# Patient Record
Sex: FEMALE | Race: BLACK OR AFRICAN AMERICAN
[De-identification: names, ages, dates, MRNs, and addresses within clinical notes are randomized per-mention and may not be internally consistent; named-entity substitution may affect disease eponyms.]

---

## 2019-06-10 ENCOUNTER — HOSPITAL ENCOUNTER (INPATIENT)
Dept: HOSPITAL 74 - YASAS | Age: 49
LOS: 7 days | Discharge: HOME | DRG: 772 | End: 2019-06-17
Attending: NEUROMUSCULOSKELETAL MEDICINE & OMM | Admitting: NEUROMUSCULOSKELETAL MEDICINE & OMM
Payer: COMMERCIAL

## 2019-06-10 VITALS — BODY MASS INDEX: 22.1 KG/M2

## 2019-06-10 DIAGNOSIS — Z87.09: ICD-10-CM

## 2019-06-10 DIAGNOSIS — K04.7: ICD-10-CM

## 2019-06-10 DIAGNOSIS — Z59.0: ICD-10-CM

## 2019-06-10 DIAGNOSIS — L98.491: ICD-10-CM

## 2019-06-10 DIAGNOSIS — F14.20: Primary | ICD-10-CM

## 2019-06-10 DIAGNOSIS — F10.10: ICD-10-CM

## 2019-06-10 DIAGNOSIS — Z86.2: ICD-10-CM

## 2019-06-10 PROCEDURE — HZ42ZZZ GROUP COUNSELING FOR SUBSTANCE ABUSE TREATMENT, COGNITIVE-BEHAVIORAL: ICD-10-PCS | Performed by: NEUROMUSCULOSKELETAL MEDICINE & OMM

## 2019-06-10 RX ADMIN — Medication SCH: at 23:44

## 2019-06-10 SDOH — ECONOMIC STABILITY - HOUSING INSECURITY: HOMELESSNESS: Z59.0

## 2019-06-10 NOTE — HP
CIWA Score


Nausea/Vomitin-No Nausea/No Vomiting


Muscle Tremors: None


Anxiety: 0-No Anxiety, at Ease


Agitation: 1-Slight > Activity


Paroxysmal Sweats: No Perspiration


Orientation: 0-Oriented


Tacttile Disturbances: 0-None


Auditory Disturbances: 0-None


Visual Disturbances: 0-None


Headache: 3-Moderate


CIWA-Ar Total Score: 4





- Admission Criteria


[facility] Guidelines: Admission for Medically Managed Detox: 


Requires at least one of the followin. CIWA greater than 12


2. Seizures within the past 24 hours


3. Delirium tremens within the past 24 hours


4. Hallucinations within the past 24 hours


5. Acute intervention needed for co  occurring medical disorder


6. Acute intervention needed for co  occurring psychiatric disorder


7. Severe withdrawal that cannot be handled at a lower level of care (continued


    vomiting, continued diarrhea, abnormal vital signs) requiring intravenous


    medication and/or fluids


8. Pregnancy





Patient presents the following: CIWA greater than 12


Admission Criteria Met: Admission criteria met





Admission ROS S





- HPI


Chief Complaint: 





here for rehab for cocaine/crack





47 yo with h/o asthma, anemia, homeless stays in different places in the [place]. 

Does not work. Says looking for [place] [place] housing. does not have a PCP. CIWA 

score- 6





crack/cocaine- $/day


heroin- no


alcohol- drinks every 2 days, never had any withdrawal Sx





DUR- no controlled substances


Allergies/Adverse Reactions: 


 Allergies











Allergy/AdvReac Type Severity Reaction Status Date / Time


 


No Known Allergies Allergy   Verified 06/10/19 18:22














- Ebola screening


Have you traveled outside of the country in the last 21 days: No (N)


Have you had contact with anyone from an Ebola affected area: No


Do you have a fever: No





Patient History





- Smoking Cessation


Smoking history: Unknown if ever smoked





- Substances abused


  ** Alcohol


Substance route: Skin popping


Frequency: Daily


Amount used: 2 -3(40 ox [facility] [place]), 1/2 -1 pint


Age of first use: 24


Date of last use: 19





  ** Crack


Substance route: Smoking


Frequency: Daily


Amount used: $100


Age of first use: 45


Date of last use: 19





Admission Physical Exam [facility]





- Vital Signs


Vital Signs: 


 Vital Signs - 24 hr











  06/10/19





  18:26


 


Temperature 99.3 F


 


Pulse Rate 74


 


Respiratory 18





Rate 


 


Blood Pressure 108/47 L














Breathalyzer





- Breathalyzer


Breathalyzer: 0





Urine Drug Screen





- Test Device


Lot number: [id number]


Expiration date: 21





- Control


Is test valid?: Yes





- Results


Drug screen NEGATIVE: No


Urine drug screen results: SETH-Cocaine





Inpatient Rehab Admission





- Rehab Decision to Admit


Inpatient rehab admission?: Yes





- Initial Determination


Are CD services needed?: Yes


Free of communicable disease: Yes


Not in need of hospitalization: Yes





- Rehab Admission Criteria


Previous failed treatment: Yes


Poor recovery environment: Yes


Comorbidities: No


Lacks judgement: Yes


Patient is meeting Inpatient Rehab admission criteria:: Yes (rehab from cocaine 

use)

## 2019-06-11 LAB
ALBUMIN SERPL-MCNC: 3.3 G/DL (ref 3.4–5)
ALP SERPL-CCNC: 77 U/L (ref 45–117)
ALT SERPL-CCNC: 33 U/L (ref 13–61)
ANION GAP SERPL CALC-SCNC: 8 MMOL/L (ref 8–16)
AST SERPL-CCNC: 32 U/L (ref 15–37)
BILIRUB SERPL-MCNC: 0.4 MG/DL (ref 0.2–1)
BUN SERPL-MCNC: 13.7 MG/DL (ref 7–18)
CALCIUM SERPL-MCNC: 8.6 MG/DL (ref 8.5–10.1)
CHLORIDE SERPL-SCNC: 109 MMOL/L (ref 98–107)
CO2 SERPL-SCNC: 24 MMOL/L (ref 21–32)
CREAT SERPL-MCNC: 0.8 MG/DL (ref 0.55–1.3)
DEPRECATED RDW RBC AUTO: 18.9 % (ref 11.6–15.6)
GLUCOSE SERPL-MCNC: 94 MG/DL (ref 74–106)
HCT VFR BLD CALC: 27.8 % (ref 32.4–45.2)
HGB BLD-MCNC: 8.9 GM/DL (ref 10.7–15.3)
MCH RBC QN AUTO: 25.8 PG (ref 25.7–33.7)
MCHC RBC AUTO-ENTMCNC: 32 G/DL (ref 32–36)
MCV RBC: 80.7 FL (ref 80–96)
PLATELET # BLD AUTO: 291 K/MM3 (ref 134–434)
PMV BLD: 7.8 FL (ref 7.5–11.1)
POTASSIUM SERPLBLD-SCNC: 4 MMOL/L (ref 3.5–5.1)
PROT SERPL-MCNC: 6.9 G/DL (ref 6.4–8.2)
RBC # BLD AUTO: 3.44 M/MM3 (ref 3.6–5.2)
SODIUM SERPL-SCNC: 141 MMOL/L (ref 136–145)
WBC # BLD AUTO: 6.4 K/MM3 (ref 4–10)

## 2019-06-11 RX ADMIN — Medication SCH TAB: at 09:14

## 2019-06-11 RX ADMIN — IBUPROFEN PRN MG: 400 TABLET, FILM COATED ORAL at 14:07

## 2019-06-11 RX ADMIN — AMOXICILLIN AND CLAVULANATE POTASSIUM SCH TAB: 875; 125 TABLET, FILM COATED ORAL at 17:55

## 2019-06-11 RX ADMIN — Medication SCH MG: at 21:17

## 2019-06-11 NOTE — PN
[facility] Progress Note


Note: 


PT IS A 47 Y/O FEMALE ADMITTED YESTERDAY FROM [facility] NOW  C/O LOWER JAW/CHIN AND 

TOOTHACHE. REPORTS A TYPE OF ULCER ON CHIN AREA STATING IT'S AN ABSCESS FROM 

TOOTH INFECTION. DENIES RECENT VISIT TO DENTIST. REQUESTING TO GO NOW TO SEE A 

DENTIST.  AS PER H/P, PT IS HOMELESS AND HAS NO CURRENT PCP FOR MEDICAL 

MANAGEMENT.


 


 


 Vital Signs - 24 hr











  06/10/19 06/11/19 06/11/19





  18:26 03:30 07:04


 


Temperature 99.3 F  97.9 F


 


Pulse Rate 74  62


 


Respiratory 18 16 16





Rate   


 


Blood Pressure 108/47 L  99/61








 Laboratory Tests











  06/11/19 06/11/19





  09:00 09:00


 


WBC  6.4 


 


RBC  3.44 L 


 


Hgb  8.9 L 


 


Hct  27.8 L 


 


MCV  80.7 


 


MCH  25.8 


 


MCHC  32.0 


 


RDW  18.9 H 


 


Plt Count  291 


 


MPV  7.8 


 


Sodium   141


 


Potassium   4.0


 


Chloride   109 H


 


Carbon Dioxide   24


 


Anion Gap   8


 


BUN   13.7


 


Creatinine   0.8


 


Est GFR (CKD-EPI)AfAm   101.75


 


Est GFR (CKD-EPI)NonAf   87.79


 


Random Glucose   94


 


Calcium   8.6


 


Total Bilirubin   0.4


 


AST   32


 


ALT   33


 


Alkaline Phosphatase   77


 


Total Protein   6.9


 


Albumin   3.3 L








ORAL EXAM:LOWER JAW TOOTH DECAY /GUM SWELLING ALONG LOWER INCISOR.


DARK COLORED OPEN SORE TO CHIN 





A:TOOTH DECAY


TOOTH ABSCESS


CHIN ULCER





PLAN:BACITRACIN OINTMENT BID TO ULCER ON CHIN.


AUGMENTIN 875 MG PO BID 7 DAYS

## 2019-06-12 RX ADMIN — AMOXICILLIN AND CLAVULANATE POTASSIUM SCH TAB: 875; 125 TABLET, FILM COATED ORAL at 07:36

## 2019-06-12 RX ADMIN — Medication SCH MG: at 21:30

## 2019-06-12 RX ADMIN — Medication SCH TAB: at 10:11

## 2019-06-12 RX ADMIN — AMOXICILLIN AND CLAVULANATE POTASSIUM SCH TAB: 875; 125 TABLET, FILM COATED ORAL at 18:14

## 2019-06-13 RX ADMIN — IBUPROFEN PRN MG: 400 TABLET, FILM COATED ORAL at 11:14

## 2019-06-13 RX ADMIN — AMOXICILLIN AND CLAVULANATE POTASSIUM SCH TAB: 875; 125 TABLET, FILM COATED ORAL at 07:41

## 2019-06-13 RX ADMIN — Medication SCH: at 11:02

## 2019-06-13 RX ADMIN — Medication SCH MG: at 21:46

## 2019-06-13 RX ADMIN — AMOXICILLIN AND CLAVULANATE POTASSIUM SCH TAB: 875; 125 TABLET, FILM COATED ORAL at 16:53

## 2019-06-14 RX ADMIN — Medication SCH TAB: at 10:58

## 2019-06-14 RX ADMIN — Medication SCH MG: at 21:13

## 2019-06-14 RX ADMIN — AMOXICILLIN AND CLAVULANATE POTASSIUM SCH TAB: 875; 125 TABLET, FILM COATED ORAL at 17:30

## 2019-06-14 RX ADMIN — AMOXICILLIN AND CLAVULANATE POTASSIUM SCH TAB: 875; 125 TABLET, FILM COATED ORAL at 09:00

## 2019-06-15 RX ADMIN — Medication SCH MG: at 21:11

## 2019-06-15 RX ADMIN — Medication SCH TAB: at 10:02

## 2019-06-15 RX ADMIN — AMOXICILLIN AND CLAVULANATE POTASSIUM SCH TAB: 875; 125 TABLET, FILM COATED ORAL at 07:19

## 2019-06-15 RX ADMIN — AMOXICILLIN AND CLAVULANATE POTASSIUM SCH TAB: 875; 125 TABLET, FILM COATED ORAL at 16:41

## 2019-06-16 RX ADMIN — Medication SCH TAB: at 09:10

## 2019-06-16 RX ADMIN — AMOXICILLIN AND CLAVULANATE POTASSIUM SCH TAB: 875; 125 TABLET, FILM COATED ORAL at 07:41

## 2019-06-16 RX ADMIN — Medication SCH: at 21:43

## 2019-06-16 RX ADMIN — AMOXICILLIN AND CLAVULANATE POTASSIUM SCH: 875; 125 TABLET, FILM COATED ORAL at 18:45

## 2019-06-17 ENCOUNTER — HOSPITAL ENCOUNTER (EMERGENCY)
Dept: HOSPITAL 74 - JER | Age: 49
Discharge: HOME | End: 2019-06-17
Payer: COMMERCIAL

## 2019-06-17 VITALS — TEMPERATURE: 98.7 F | DIASTOLIC BLOOD PRESSURE: 62 MMHG | HEART RATE: 72 BPM | SYSTOLIC BLOOD PRESSURE: 98 MMHG

## 2019-06-17 VITALS — SYSTOLIC BLOOD PRESSURE: 108 MMHG | HEART RATE: 76 BPM | DIASTOLIC BLOOD PRESSURE: 63 MMHG | TEMPERATURE: 98 F

## 2019-06-17 VITALS — BODY MASS INDEX: 19.3 KG/M2

## 2019-06-17 DIAGNOSIS — F14.10: ICD-10-CM

## 2019-06-17 DIAGNOSIS — F10.10: ICD-10-CM

## 2019-06-17 DIAGNOSIS — R51: Primary | ICD-10-CM

## 2019-06-17 DIAGNOSIS — J45.909: ICD-10-CM

## 2019-06-17 DIAGNOSIS — Z86.2: ICD-10-CM

## 2019-06-17 LAB
ALBUMIN SERPL-MCNC: 3.7 G/DL (ref 3.4–5)
ALP SERPL-CCNC: 73 U/L (ref 45–117)
ALT SERPL-CCNC: 40 U/L (ref 13–61)
ANION GAP SERPL CALC-SCNC: 5 MMOL/L (ref 8–16)
AST SERPL-CCNC: 53 U/L (ref 15–37)
BASOPHILS # BLD: 0.3 % (ref 0–2)
BILIRUB SERPL-MCNC: 0.2 MG/DL (ref 0.2–1)
BUN SERPL-MCNC: 17.6 MG/DL (ref 7–18)
CALCIUM SERPL-MCNC: 8.8 MG/DL (ref 8.5–10.1)
CHLORIDE SERPL-SCNC: 101 MMOL/L (ref 98–107)
CO2 SERPL-SCNC: 29 MMOL/L (ref 21–32)
CREAT SERPL-MCNC: 0.8 MG/DL (ref 0.55–1.3)
DEPRECATED RDW RBC AUTO: 18.8 % (ref 11.6–15.6)
EOSINOPHIL # BLD: 2.4 % (ref 0–4.5)
GLUCOSE SERPL-MCNC: 88 MG/DL (ref 74–106)
HCT VFR BLD CALC: 27.4 % (ref 32.4–45.2)
HGB BLD-MCNC: 8.8 GM/DL (ref 10.7–15.3)
LYMPHOCYTES # BLD: 16.3 % (ref 8–40)
MCH RBC QN AUTO: 25.5 PG (ref 25.7–33.7)
MCHC RBC AUTO-ENTMCNC: 32 G/DL (ref 32–36)
MCV RBC: 79.8 FL (ref 80–96)
MONOCYTES # BLD AUTO: 20.8 % (ref 3.8–10.2)
NEUTROPHILS # BLD: 60.2 % (ref 42.8–82.8)
PLATELET # BLD AUTO: 248 K/MM3 (ref 134–434)
PLATELET BLD QL SMEAR: ADEQUATE
PMV BLD: 7.4 FL (ref 7.5–11.1)
POTASSIUM SERPLBLD-SCNC: 4.6 MMOL/L (ref 3.5–5.1)
PROT SERPL-MCNC: 7.6 G/DL (ref 6.4–8.2)
RBC # BLD AUTO: 3.43 M/MM3 (ref 3.6–5.2)
SODIUM SERPL-SCNC: 134 MMOL/L (ref 136–145)
WBC # BLD AUTO: 4 K/MM3 (ref 4–10)

## 2019-06-17 PROCEDURE — 3E0337Z INTRODUCTION OF ELECTROLYTIC AND WATER BALANCE SUBSTANCE INTO PERIPHERAL VEIN, PERCUTANEOUS APPROACH: ICD-10-PCS

## 2019-06-17 PROCEDURE — 3E033GC INTRODUCTION OF OTHER THERAPEUTIC SUBSTANCE INTO PERIPHERAL VEIN, PERCUTANEOUS APPROACH: ICD-10-PCS

## 2019-06-17 RX ADMIN — AMOXICILLIN AND CLAVULANATE POTASSIUM SCH: 875; 125 TABLET, FILM COATED ORAL at 07:55

## 2019-06-17 RX ADMIN — Medication SCH: at 10:23

## 2019-06-17 NOTE — PDOC
Rapid Medical Evaluation


Time Seen by Provider: 06/17/19 13:18


Medical Evaluation: 


 Allergies











Allergy/AdvReac Type Severity Reaction Status Date / Time


 


No Known Allergies Allergy   Verified 06/10/19 18:22











06/17/19 13:18


This patient had brief in-person evaluation in triage





cc: migraine x 1 week. sent from [facility]


took ibuprofen with no relief of pain.  Also reports nausea





PE:


NAD


unlabored breathing 


EOMI, full strength in limbs





orders:


analgesia ordered


This patient will proceed to the Ed for further evaluation

## 2019-06-17 NOTE — PN
[facility] Progress Note


Note: 


Notified by RN (via call book) that patient c/o leg cramps. Patient was on pay 

phone when provided approached but patient yelled " I wanted don't want to see 

nobody!!". Patient also refused to see provider when RN informed her NP was on 

unit. Patient was noted pacing in hallway, alert and oriented x 3, in no acute 

distress, packing clothes and argumentative with staff when ambulance arrived 

on unit with security. Patient had called 911 to go to ER for leg cramps and 

headache. With ambulance present, patient was in room combing hair, amb ad caprice,

  and collecting cereal from dining area. Patient left unit on stretcher with 

ambulance/2 EMT workers, alert and oriented x 3 in no acute distress. 


 Vital Signs











Temperature  98.7 F   06/17/19 07:08


 


Pulse Rate  72   06/17/19 07:08


 


Respiratory Rate  18   06/17/19 07:08


 


Blood Pressure  98/62   06/17/19 07:08


 


O2 Sat by Pulse Oximetry (%)      











 Laboratory Tests











  06/10/19 06/11/19 06/11/19





  19:18 09:00 09:00


 


WBC   6.4 


 


RBC   3.44 L 


 


Hgb   8.9 L 


 


Hct   27.8 L 


 


MCV   80.7 


 


MCH   25.8 


 


MCHC   32.0 


 


RDW   18.9 H 


 


Plt Count   291 


 


MPV   7.8 


 


Sodium    141


 


Potassium    4.0


 


Chloride    109 H


 


Carbon Dioxide    24


 


Anion Gap    8


 


BUN    13.7


 


Creatinine    0.8


 


Est GFR (CKD-EPI)AfAm    101.75


 


Est GFR (CKD-EPI)NonAf    87.79


 


Random Glucose    94


 


Calcium    8.6


 


Total Bilirubin    0.4


 


AST    32


 


ALT    33


 


Alkaline Phosphatase    77


 


Total Protein    6.9


 


Albumin    3.3 L


 


POC Urine HCG, Qual  Negative  


 


RPR Titer   














  06/11/19





  09:00


 


WBC 


 


RBC 


 


Hgb 


 


Hct 


 


MCV 


 


MCH 


 


MCHC 


 


RDW 


 


Plt Count 


 


MPV 


 


Sodium 


 


Potassium 


 


Chloride 


 


Carbon Dioxide 


 


Anion Gap 


 


BUN 


 


Creatinine 


 


Est GFR (CKD-EPI)AfAm 


 


Est GFR (CKD-EPI)NonAf 


 


Random Glucose 


 


Calcium 


 


Total Bilirubin 


 


AST 


 


ALT 


 


Alkaline Phosphatase 


 


Total Protein 


 


Albumin 


 


POC Urine HCG, Qual 


 


RPR Titer  Nonreactive

## 2019-06-17 NOTE — PDOC
Documentation entered by Shadi Burgos SCRIBE, acting as scribe for Germania Flores MD.








Germania Flores MD:  This documentation has been prepared by the Aubrey love Joel, SCRIBE, under my direction and personally reviewed by me in its 

entirety.  I confirm that the documentation accurately reflects all work, 

treatment, procedures, and medical decision making performed by me.  





Attending Attestation





- Resident


Resident Name: Sancho Ashraf





- ED Attending Attestation


I have performed the following: I have examined & evaluated the patient, The 

case was reviewed & discussed with the resident, I agree w/resident's findings 

& plan, Exceptions are as noted





- HPI


HPI: 





06/17/19 17:22


slender 47 yo female presents from [facility] for headache. She states she 

has a history of migraines.





- Physicial Exam


PE: 





06/17/19 17:26


47 yo female is alert and conversant


head ncat


neck supple


lungs cta b/l


cvs [id number]


abd soft,nontender


skin warm and dry


no cva tenderness


neuro axox3,ambulatory


psych  calm and cooperative 








- Medical Decision Making





06/17/19 18:20


labs reviewed and she has a chronic anemia


her headache resolved with IVF and reglan and she has no focal neuro deficits 

and is being discharged back to [facility]

## 2019-06-17 NOTE — PDOC
History of Present Illness





- General


Chief Complaint: Migraine Headache


Stated Complaint: MIGRAINE


Time Seen by Provider: 06/17/19 13:18





- History of Present Illness


Initial Comments: 





06/17/19 16:06


Ms. Collins is a 49 yo female w/ pmh of asthma, anemia, and substance abuse (

currently detoxing from alcohol and crack/cocaine; last use 6/7/19) who 

presents for evaluation of 1 week history of constant headache typical of her 

migraines. Patient reports she has often had migraines lasting for much longer 

for this and that light and sound make it worse. It is not her worst headache 

nor was it sudden onset in nature. Patient reports it has coincided with her 

alcohol cessation. Also reports intermittent cramps for the last several weeks. 

Denies any other symptoms at this time.





The patient denies chest pain, shortness of breath, and dizziness. Denies fever

, chills, nausea, vomit, diarrhea and constipation. Denies dysuria, frequency, 

urgency and hematuria. 





Past History





- Past Medical History


Allergies/Adverse Reactions: 


 Allergies











Allergy/AdvReac Type Severity Reaction Status Date / Time


 


No Known Allergies Allergy   Verified 06/10/19 18:22











Home Medications: 


Ambulatory Orders





Amox-Tr/K Cl [Augmentin - 875Mg Tablet] 1 tab PO BID 06/17/19 








Asthma: No


Cardiac Disorders: No


COPD: No


Diabetes: No


GI Disorders: No


 Disorders: No


HTN: No


Kidney Stones: No


Seizures: No





- Surgical History


Abdominal Surgery: No


Appendectomy: No


Cardiac Surgery: No


Cholecystectomy: No


Lung Surgery: No


Neurologic Surgery: No


Orthopedic Surgery: No





- Immunization History


Immunization Up to Date: No





- Suicide/Smoking/Psychosocial Hx


Smoking History: Current every day smoker


Have you smoked in the past 12 months: Yes


Information on smoking cessation initiated: No


Hx Alcohol Use: Yes


Drug/Substance Use Hx: Yes


Hx Substance Use Treatment: No





**Review of Systems





- Review of Systems


Comments:: 





06/17/19 16:24


GENERAL/CONSTITUTIONAL: No fever or chills. No weakness.


HEAD, EYES, EARS, NOSE AND THROAT: No change in vision. No ear pain or 

discharge. No sore throat.


CARDIOVASCULAR: No chest pain or shortness of breath


RESPIRATORY: No cough, wheezing, or hemoptysis.


GASTROINTESTINAL: No nausea, vomiting, diarrhea or constipation.


GENITOURINARY: No dysuria, frequency, or change in urination.


MUSCULOSKELETAL: +Cramps as described. No joint or muscle swelling or pain. No 

neck or back pain.


SKIN: No rash


NEUROLOGIC: +Headache as described. No vertigo, loss of consciousness, or 

change in strength/sensation.


ENDOCRINE: No increased thirst. No abnormal weight change


HEMATOLOGIC/LYMPHATIC: No anemia, easy bleeding, or history of blood clots.


ALLERGIC/IMMUNOLOGIC: No hives or skin allergy.





*Physical Exam





- Vital Signs


 Last Vital Signs











Temp Pulse Resp BP Pulse Ox


 


 98.0 F   76   18   108/63   99 


 


 06/17/19 13:18  06/17/19 13:18  06/17/19 13:18  06/17/19 13:18  06/17/19 13:18














- Physical Exam


Comments: 





06/17/19 16:24


GENERAL: Awake, alert, and fully oriented, in no acute distress


HEAD: No signs of trauma, normocephalic, atraumatic 


EYES: PERRLA, EOMI, sclera anicteric, conjunctiva clear


ENT: Auricles normal inspection, hearing grossly normal, nares patent, 

oropharynx clear without


exudates. Moist mucosa


NECK: Normal ROM, supple, no lymphadenopathy, JVD, or masses


LUNGS: No distress, speaks full sentences, clear to auscultation bilaterally 


HEART: Regular rate and rhythm, normal S1 and S2, no murmurs, rubs or gallops, 

peripheral pulses normal and equal bilaterally. 


ABDOMEN: Soft, nontender, normoactive bowel sounds. No guarding, no rebound. No 

masses


EXTREMITIES: Normal inspection, Normal range of motion, no edema. No clubbing 

or cyanosis. 


NEUROLOGICAL: Cranial nerves II through XII grossly intact. Normal speech, 

normal gait, no focal sensorimotor deficits 


SKIN: Warm, Dry, normal turgor, no rashes or lesions noted. 





ED Treatment Course





- LABORATORY


CBC & Chemistry Diagram: 


 06/17/19 16:40





 06/17/19 16:40





Medical Decision Making





- Medical Decision Making





06/17/19 16:30


Ms. Collins is a 49 yo female w/ pmh as described who presents for evaluation 

of headache. Patient evaluated for electrolyte abnormalities given cramps and 

given analgesia w/ reglan/fluids. Patient currently pending further workup.





06/17/19 17:52


Patient evaluated with labs as below. Noted to be anemic to 8.8/27.4, stable 

from 8.9/27.8 on 6/11. Patient reporting improvement of headache. Patient 

evaluated by social work on request of patient for housing options following 

rehab as patient is currently homeless. Please see social work note for further 

information. No concern for acute process at this itme. Discharging to home.








 Laboratory Results - last 24 hr











  06/17/19 06/17/19





  16:40 16:40


 


WBC  4.0 


 


RBC  3.43 L 


 


Hgb  8.8 L 


 


Hct  27.4 L 


 


MCV  79.8 L 


 


MCH  25.5 L 


 


MCHC  32.0 


 


RDW  18.8 H 


 


Plt Count  248 


 


MPV  7.4 L 


 


Absolute Neuts (auto)  2.4 


 


Neutrophils %  60.2 


 


Lymphocytes %  16.3 


 


Monocytes %  20.8 H 


 


Eosinophils %  2.4 


 


Basophils %  0.3 


 


Nucleated RBC %  0 


 


Sodium   134 L


 


Potassium   4.6


 


Chloride   101


 


Carbon Dioxide   29


 


Anion Gap   5 L


 


BUN   17.6


 


Creatinine   0.8


 


Est GFR (CKD-EPI)AfAm   101.75


 


Est GFR (CKD-EPI)NonAf   87.79


 


Random Glucose   88


 


Calcium   8.8


 


Total Bilirubin   0.2


 


AST   53 H


 


ALT   40


 


Alkaline Phosphatase   73


 


Total Protein   7.6


 


Albumin   3.7











06/17/19 17:53








*DC/Admit/Observation/Transfer


Diagnosis at time of Disposition: 


Headache


Qualifiers:


 Headache type: unspecified Headache chronicity pattern: unspecified pattern 

Intractability: not intractable Qualified Code(s): R51 - Headache








- Discharge Dispostion


Disposition: HOME


Condition at time of disposition: Stable





- Referrals





- Patient Instructions


Printed Discharge Instructions:  DI for Migraine


Additional Instructions: 


You were evaluated today in the ER for your headache. We performed laboratory 

analysis and gave you medications which improved your symptoms. We do not 

believe anything emergent is occurring at this time. Please follow-up with 

primary care provider in 2-3 days for further evaluation. Return to ER if any 

increase in headache, change in mental status, fever, chills, or other 

concerning symptoms.





- Post Discharge Activity